# Patient Record
Sex: MALE | Race: WHITE | Employment: FULL TIME | URBAN - METROPOLITAN AREA
[De-identification: names, ages, dates, MRNs, and addresses within clinical notes are randomized per-mention and may not be internally consistent; named-entity substitution may affect disease eponyms.]

---

## 2022-12-09 ENCOUNTER — OFFICE VISIT (OUTPATIENT)
Dept: NEUROLOGY | Age: 32
End: 2022-12-09
Payer: OTHER GOVERNMENT

## 2022-12-09 VITALS
BODY MASS INDEX: 34.44 KG/M2 | WEIGHT: 246 LBS | SYSTOLIC BLOOD PRESSURE: 118 MMHG | DIASTOLIC BLOOD PRESSURE: 79 MMHG | HEIGHT: 71 IN | HEART RATE: 81 BPM

## 2022-12-09 DIAGNOSIS — G40.909 SEIZURE DISORDER (HCC): Primary | ICD-10-CM

## 2022-12-09 PROCEDURE — 99204 OFFICE O/P NEW MOD 45 MIN: CPT | Performed by: PSYCHIATRY & NEUROLOGY

## 2022-12-09 RX ORDER — SUMATRIPTAN 100 MG/1
100 TABLET, FILM COATED ORAL
COMMUNITY

## 2022-12-09 RX ORDER — TOPIRAMATE 50 MG/1
50 TABLET, FILM COATED ORAL 2 TIMES DAILY
Qty: 180 TABLET | Refills: 2 | Status: SHIPPED | OUTPATIENT
Start: 2022-12-09 | End: 2023-03-09

## 2022-12-09 RX ORDER — TOPIRAMATE 50 MG/1
50 TABLET, FILM COATED ORAL 2 TIMES DAILY
COMMUNITY
End: 2022-12-09 | Stop reason: SDUPTHER

## 2022-12-09 NOTE — PROGRESS NOTES
[facility]  [facility], [address], [address] [place] [address]  Ph: [phone] or [phone]  FAX: [phone]    Chief Complaint: Seizures     Dear No primary care provider on file. I had the pleasure of seeing your patient today in neurology consultation for his symptoms. As you would recall Rebecca Rouse is a 27 y.o. male. Patient presents to the office to establish neurological care. Patient is accompanied by his wife and son. Patient has a history seizures and migraine headaches. First reported seizure was in November 2021. Specifically, the patient had a grand mal seizure with foaming of the mouth. No tongue biting or bladder/bowel incontinence reported. Patient subsequently underwent workup and was put on Keppra for seizure prophylaxis. Patient reports that his MRI scan of the brain identified the patient as having hydrocephalus. EEG monitoring has been normal thus far. Patient had a second breakthrough event in July 2022 which was similar to the previous episode. Patient's wife reports that he experiences a secondary seizure after each of the two events. His Keppra dose was then increased from 1000 mg BID to 1500 mg BID. Patient has been seizure-free since the last episode in July. He admits that his headaches are under control with Topamax. Denies previous history of head trauma. No reported numbness, weakness, or tingling in his extremities. Patient's oldest son has a history of seizures. Denies driving at this time. Patient was previously in [employer] and lived in [place], but has since moved to [place].          Current prophylactic medication Dosage   Keppra 1500 mg BID   Topamax 50 mg BID           Previous prophylactic medications Reason for discontinuation                             Previous Abortive medications Reason for discontinuation   Tylenol Did not find relief    Ibuprofen    Naproxen    Excedrin Migraine    Fioricet              Past Medical History:   Diagnosis Date    Migraine     Seizures ([facility] [address]      No past surgical history on file. No Known Allergies  No family history on file.    Social History     Socioeconomic History    Marital status:      Spouse name: Not on file    Number of children: Not on file    Years of education: Not on file    Highest education level: Not on file   Occupational History    Not on file   Tobacco Use    Smoking status: Never    Smokeless tobacco: Never   Vaping Use    Vaping Use: Never used   Substance and Sexual Activity    Alcohol use: Yes     Comment: rare    Drug use: Never    Sexual activity: Not on file   Other Topics Concern    Not on file   Social History Narrative    Not on file     Social Determinants of Health     Financial Resource Strain: Not on file   Food Insecurity: Not on file   Transportation Needs: Not on file   Physical Activity: Not on file   Stress: Not on file   Social Connections: Not on file   Intimate Partner Violence: Not on file   Housing Stability: Not on file      /79 (Site: Left Upper Arm, Position: Sitting, Cuff Size: Medium Adult)   Pulse 81   Ht 5' 11\" (1.803 m)   Wt 246 lb (111.6 kg)   BMI 34.31 kg/m²       HEENT [] Hearing Loss  [] Visual Disturbance  [] Tinnitus  [] Eye pain   Respiratory [] Shortness of Breath  [] Cough  [] Snoring   Cardiovascular [] Chest Pain  [] Palpitations  [] Lightheaded   GI [] Constipation  [] Diarrhea  [] Swallowing change  [] Nausea/vomiting    [] Urinary Frequency  [] Urinary Urgency   Musculoskeletal [] Neck pain  [] Back pain  [] Muscle pain  [] Restless legs   Dermatologic [] Skin changes   Neurologic [] Memory loss/confusion  [x] Seizures  [] Trouble walking or imbalance  [] Dizziness  [] Sleep disturbance  [] Weakness  [] Numbness  [] Tremors  [] Speech Difficulty  [x] Headaches  [] Light Sensitivity  [] Sound Sensitivity   Endocrinology []Excessive thirst  []Excessive hunger   Psychiatric [] Anxiety/Depression  [] Hallucination Allergy/immunology []Hives/environmental allergies   Hematologic/lymph [] Abnormal bleeding  [] Abnormal bruising       General appearence: Normal. Well groomed. In no acute distress    Head: Normocephalic, atraumatic  Eyes: Extraocular movements intact, eye lids normal  Lungs: Respirations unlabored, chest wall no deformity  ENT: Normal external ear canals, no sinus tenderness  Heart: Regular rate rhythm  Abdomen: No masses, tenderness  Extremities: No cyanosis or edema, 2+ pulses  Musculoskeletal: Normal range of motion in all joints  Skin: Intact, normal skin color    Neurological examination:    Mental status   Alert and oriented; intact memory with no confusion, speech or language problems; no hallucinations or delusions     Cranial nerves   II - visual fields intact to confrontation                                                III, IV, VI - extra-ocular muscles full: no pupillary defect; no EPIFANIO, no nystagmus, no ptosis   V - normal facial sensation                                                               VII - normal facial symmetry                                                             VIII - intact hearing                                                                             IX, X - symmetrical palate                                                                  XI - symmetrical shoulder shrug                                                       XII - midline tongue without atrophy or fasciculation     Motor function  Normal muscle bulk and tone; normal power 5/5, including fine motor movements     Sensory function Intact to touch, pin, vibration, proprioception     Cerebellar Intact fine motor movement. No involuntary movements or tremors     Reflex function Intact 2+ DTR and symmetric.  Negative Babinski     Gait                  Normal station and gait       No results found for: LDLCALC, LDLCHOLESTEROL, LDLDIRECT  No components found for: CHLPL  No results found for: TRIG  No results found for: HDL  No results found for: LDLCALC  No results found for: LABVLDL  No results found for: LABA1C  No results found for: EAG  No results found for: [id number]   Neurological work up:  CT head  CTA head and neck  MRI brain   2 D echo     All of patient's labs were personally reviewed. All the imaging studies were personally reviewed and discussed with the patient. Assessment Recommendations:  Seizure Disorder  Chronic migraine headaches without aura or status migrainosus    The patient has a history of grand mal seizures likely secondary to hydrocephalus, which is well controlled with Keppra 1500 mg BID. I recommend he continue Keppra as directed for seizure prophylaxis. In addition to seizure medications, therapeutic lifestyle changes may also reduce the likelihood of seizures. These modifications include avoiding excessive alcohol use, obtaining adequate and appropriate sleep, and reducing stress. Dietary changes such as, a modified Atkins diet (high fat and protein/low carbohydrate), may reduce the risk of seizures as well. Seizure and epilepsy safety was also outlined. Specifically, we reviewed that he would not be able to operate a motor vehicle in the state of [place] until he has been seizure free for 6 months. Other restrictions were reviewed including operating heavy or electrical machinery, swimming alone, navigating heights, or engaging in any activity that may be injurious to him or others if there was an unexpected loss of consciousness or loss of function. It was explained to the patient that chronic use of some seizure medications may lead to osteoporosis or osteopenia. Daily supplementation of Vitamin D and calcium was encouraged along with a bone density study to determine overall bone health.  The potential risks from seizures including aspiration pneumonia, vertebral compression fracture, shoulder/mandible dislocation, head trauma, burns, drowning, and death (SUDEP) were briefly reviewed. The potential for neuronal injury from prolonged seizures or status epilepticus was discussed. In summary, the AAN quality measures for epilepsy (Neurology. 0125;28:1056-4715) were reviewed and outlined, as detailed above in the history of present illness, interval history, epilepsy history, impression, and discussion sections as well as in the patient's After Visit Summary. These include: (1a) Seizure frequency (at each encounter); (1b) Seizure intervention (at each encounter); (2) Etiology, seizure type, and epilepsy syndrome (at each encounter); (3) Querying and intervention for seizure medication side effects (at each encounter); (4) Personalized epilepsy safety issue and education (yearly); (5) Screening for psychiatric or behavioral health disorders (at each encounter); (6) Counseling for women with epilepsy of childbearing potential (yearly); and finally, (7) Referral of drug resistant epilepsy to a comprehensive epilepsy program/center (every 2 years). Patient reports that his headaches are currently under control, and I recommend he continue Topamax 50 mg BID for headache prophylaxis. If headaches become more frequent or severe, Topamax can be increased as the patient tolerates. Additionally, patient to continue Imitrex 100 mg as needed for abortive treatment. All medication side effects were discussed and questions answered. Patient to follow up in one year or sooner if symptoms worsen. No primary care provider on file. I would like to thank you for the consult. Please do not hesitate if you have any questions about the patient care. This note is created with the assistance of a speech-recognition program. While intending to generate a document that actually reflects the content of the visit, the document can still have some errors including those of syntax and sound a- like substitutions which may escape proofreading.   In such instances, actual meaning can be extrapolated by contextual derivation. Scribe Attestation:   By signing my name below, Javier Killian, attest that this documentation has been prepared under the direction and in the presence of Hazel Velasquez MD.    Electronically Signed: Jaime Lopez. 12/09/22.  2:35 PM

## 2022-12-13 ENCOUNTER — TELEPHONE (OUTPATIENT)
Dept: NEUROLOGY | Age: 32
End: 2022-12-13

## 2023-01-19 ENCOUNTER — TELEPHONE (OUTPATIENT)
Dept: NEUROLOGY | Age: 33
End: 2023-01-19

## 2023-01-19 NOTE — TELEPHONE ENCOUNTER
Landen called the office and stated that he has not had a seizure since 7/19/2022. He would like to know if Dr. Jeremiah Machado will clear him to be able to start driving. Please advise. Thank you.

## 2023-01-27 NOTE — TELEPHONE ENCOUNTER
Call placed back to Landen and this information was given. Patient asked if he needed a letter. I asked if someone was requiring this and he stated \"no\". I explained that he shouldn't need a letter. I asked that he call the office right away if he has a seizure. Patient verbally stated his understanding.

## 2023-05-22 DIAGNOSIS — G40.909 SEIZURE DISORDER (HCC): Primary | ICD-10-CM

## 2023-05-22 NOTE — TELEPHONE ENCOUNTER
Patient called in requesting a refill of Keppra 1500mg BID.       Medication active on med list yes      Date of last Rx: 12/9/2022 with unknown refills (popping up as historical provider)         verified by Veronica Jose LPN      Date of last appointment 12/9/2022    Next Visit Date:  Visit date not found (pt set to return around 12/9/2023)

## 2023-05-23 RX ORDER — LEVETIRACETAM 750 MG/1
1500 TABLET ORAL 2 TIMES DAILY
Qty: 120 TABLET | Refills: 6 | Status: SHIPPED | OUTPATIENT
Start: 2023-05-23 | End: 2023-12-19

## 2023-08-30 NOTE — TELEPHONE ENCOUNTER
Pharmacy requesting refill of Topmamx.       Medication active on med list yes      Date of last fill: 12/9/22  verified on 8/30/2023   verified by [facility] LPN      Date of last appointment 12/9/22    Next Visit Date:  12/4/2023

## 2023-08-31 RX ORDER — TOPIRAMATE 50 MG/1
TABLET, FILM COATED ORAL
Qty: 180 TABLET | Refills: 2 | Status: SHIPPED | OUTPATIENT
Start: 2023-08-31

## 2024-01-02 ENCOUNTER — TELEPHONE (OUTPATIENT)
Dept: NEUROLOGY | Age: 34
End: 2024-01-02

## 2024-01-02 ENCOUNTER — HOSPITAL ENCOUNTER (EMERGENCY)
Age: 34
Discharge: HOME | End: 2024-01-02
Attending: EMERGENCY MEDICINE
Payer: OTHER GOVERNMENT

## 2024-01-02 VITALS
DIASTOLIC BLOOD PRESSURE: 81 MMHG | HEIGHT: 72 IN | SYSTOLIC BLOOD PRESSURE: 120 MMHG | BODY MASS INDEX: 32.51 KG/M2 | WEIGHT: 240 LBS | RESPIRATION RATE: 24 BRPM | HEART RATE: 88 BPM | OXYGEN SATURATION: 98 % | TEMPERATURE: 98 F

## 2024-01-02 DIAGNOSIS — R40.4 TRANSIENT ALTERATION OF AWARENESS: Primary | ICD-10-CM

## 2024-01-02 LAB
ALBUMIN SERPL-MCNC: 4.6 G/DL (ref 3.5–5.2)
ALP SERPL-CCNC: 75 U/L (ref 40–129)
ALT SERPL-CCNC: 25 U/L (ref 5–41)
ANION GAP SERPL CALCULATED.3IONS-SCNC: 13 MMOL/L (ref 9–17)
AST SERPL-CCNC: 20 U/L
BASOPHILS # BLD: 0.1 K/UL (ref 0–0.2)
BASOPHILS NFR BLD: 1 % (ref 0–2)
BILIRUB SERPL-MCNC: 0.4 MG/DL (ref 0.3–1.2)
BILIRUB UR QL STRIP: NEGATIVE
BUN SERPL-MCNC: 8 MG/DL (ref 6–20)
CALCIUM SERPL-MCNC: 9.1 MG/DL (ref 8.6–10.4)
CHLORIDE SERPL-SCNC: 104 MMOL/L (ref 98–107)
CLARITY UR: CLEAR
CO2 SERPL-SCNC: 24 MMOL/L (ref 20–31)
COLOR UR: YELLOW
COMMENT: NORMAL
CREAT SERPL-MCNC: 1 MG/DL (ref 0.7–1.2)
EOSINOPHIL # BLD: 0.1 K/UL (ref 0–0.4)
EOSINOPHILS RELATIVE PERCENT: 1 % (ref 0–4)
ERYTHROCYTE [DISTWIDTH] IN BLOOD BY AUTOMATED COUNT: 13.3 % (ref 11.5–14.9)
FLUAV RNA RESP QL NAA+PROBE: NOT DETECTED
FLUBV RNA RESP QL NAA+PROBE: NOT DETECTED
GFR SERPL CREATININE-BSD FRML MDRD: >60 ML/MIN/1.73M2
GLUCOSE SERPL-MCNC: 93 MG/DL (ref 70–99)
GLUCOSE UR STRIP-MCNC: NEGATIVE MG/DL
HCT VFR BLD AUTO: 44.9 % (ref 41–53)
HGB BLD-MCNC: 15.3 G/DL (ref 13.5–17.5)
HGB UR QL STRIP.AUTO: NEGATIVE
KETONES UR STRIP-MCNC: NEGATIVE MG/DL
LEUKOCYTE ESTERASE UR QL STRIP: NEGATIVE
LYMPHOCYTES NFR BLD: 2.8 K/UL (ref 1–4.8)
LYMPHOCYTES RELATIVE PERCENT: 33 % (ref 24–44)
MAGNESIUM SERPL-MCNC: 2.3 MG/DL (ref 1.6–2.6)
MCH RBC QN AUTO: 29.6 PG (ref 26–34)
MCHC RBC AUTO-ENTMCNC: 34.1 G/DL (ref 31–37)
MCV RBC AUTO: 87 FL (ref 80–100)
MONOCYTES NFR BLD: 0.5 K/UL (ref 0.1–1.3)
MONOCYTES NFR BLD: 6 % (ref 1–7)
NEUTROPHILS NFR BLD: 59 % (ref 36–66)
NEUTS SEG NFR BLD: 5 K/UL (ref 1.3–9.1)
NITRITE UR QL STRIP: NEGATIVE
PH UR STRIP: 6.5 [PH] (ref 5–8)
PLATELET # BLD AUTO: 266 K/UL (ref 150–450)
PMV BLD AUTO: 8.2 FL (ref 6–12)
POTASSIUM SERPL-SCNC: 3.8 MMOL/L (ref 3.7–5.3)
PROT SERPL-MCNC: 7.3 G/DL (ref 6.4–8.3)
PROT UR STRIP-MCNC: NEGATIVE MG/DL
RBC # BLD AUTO: 5.16 M/UL (ref 4.5–5.9)
SARS-COV-2 RNA RESP QL NAA+PROBE: NOT DETECTED
SODIUM SERPL-SCNC: 141 MMOL/L (ref 135–144)
SOURCE: NORMAL
SP GR UR STRIP: 1.01 (ref 1–1.03)
SPECIMEN DESCRIPTION: NORMAL
UROBILINOGEN UR STRIP-ACNC: NORMAL EU/DL (ref 0–1)
WBC OTHER # BLD: 8.5 K/UL (ref 3.5–11)

## 2024-01-02 PROCEDURE — 99284 EMERGENCY DEPT VISIT MOD MDM: CPT

## 2024-01-02 PROCEDURE — 93005 ELECTROCARDIOGRAM TRACING: CPT | Performed by: EMERGENCY MEDICINE

## 2024-01-02 PROCEDURE — 87636 SARSCOV2 & INF A&B AMP PRB: CPT

## 2024-01-02 PROCEDURE — 85025 COMPLETE CBC W/AUTO DIFF WBC: CPT

## 2024-01-02 PROCEDURE — 81003 URINALYSIS AUTO W/O SCOPE: CPT

## 2024-01-02 PROCEDURE — 83735 ASSAY OF MAGNESIUM: CPT

## 2024-01-02 PROCEDURE — 36415 COLL VENOUS BLD VENIPUNCTURE: CPT

## 2024-01-02 PROCEDURE — 80053 COMPREHEN METABOLIC PANEL: CPT

## 2024-01-02 ASSESSMENT — ENCOUNTER SYMPTOMS
VOMITING: 0
TROUBLE SWALLOWING: 0
BACK PAIN: 0
COLOR CHANGE: 0
DIARRHEA: 0
RHINORRHEA: 0
SHORTNESS OF BREATH: 0
BLOOD IN STOOL: 0
SORE THROAT: 0
EYE PAIN: 0
SINUS PRESSURE: 0
NAUSEA: 0
WHEEZING: 0
CONSTIPATION: 0
EYE DISCHARGE: 0
CHEST TIGHTNESS: 0
FACIAL SWELLING: 0
EYE REDNESS: 0
COUGH: 0
ABDOMINAL PAIN: 0

## 2024-01-02 ASSESSMENT — LIFESTYLE VARIABLES
HOW MANY STANDARD DRINKS CONTAINING ALCOHOL DO YOU HAVE ON A TYPICAL DAY: PATIENT DOES NOT DRINK
HOW OFTEN DO YOU HAVE A DRINK CONTAINING ALCOHOL: NEVER

## 2024-01-02 ASSESSMENT — PAIN SCALES - GENERAL: PAINLEVEL_OUTOF10: 3

## 2024-01-02 ASSESSMENT — PAIN - FUNCTIONAL ASSESSMENT: PAIN_FUNCTIONAL_ASSESSMENT: 0-10

## 2024-01-02 NOTE — ED PROVIDER NOTES
[facility]  eMERGENCY dEPARTMENT eNCOUnter      Pt Name: Landen Gamez  MRN: [id number]  Birthdate 1990  Date of evaluation: 1/2/24      CHIEF COMPLAINT       Chief Complaint   Patient presents with    Altered Mental Status     Slow to respond to questions    Headache     Since 1030 while at work, had a sharp pain to back of head  \"Not sure if I was dreaming or real\" but denies hallucinations  Hx of seizure, compliant with meds  Denies head trauma    Chest Pain     Tightness that began this am while at work         HISTORY OF PRESENT ILLNESS    Landen Gamez is a 33 y.o. male who presents complaining of confusion.  Patient states that he was at work today around 10 AM when he discussed suddenly started feeling off.  Patient states that he felt confused and when he went to go talk to his boss he started having a sharp pain in the back of his head and then developed a headache in the front of his head.  Patient states he just does not feel right.  Patient states he has not been ill recently.  Patient does have a history of seizures and is taking all of his medications like he supposed to.  Patient has no numbness tingling or weakness.  Patient does state that he feels better than he did initially but is still not back to normal.  Wife states that this is similar but not as severe as what he usually gets after a full-blown seizure.  Patient states he does not think he had a seizure and no one told him he did at work.      REVIEW OF SYSTEMS       Review of Systems   Constitutional:  Negative for activity change, appetite change, chills, diaphoresis and fever.   HENT:  Negative for congestion, ear pain, facial swelling, nosebleeds, rhinorrhea, sinus pressure, sore throat and trouble swallowing.    Eyes:  Negative for pain, discharge and redness.   Respiratory:  Negative for cough, chest tightness, shortness of breath and wheezing.    Cardiovascular:  Negative for chest pain, palpitations and leg swelling.

## 2024-01-02 NOTE — DISCHARGE INSTRUCTIONS
Thank you for visiting [facility] ED.  You need to call in morning to make appointment as directed with appropriate doctor.  Should you have any questions regarding your care or further treatment, please call [facility] Emergency Department at [phone].  Take any medications as prescribed, if given any.  Return to ED if symptoms worsen, do not improve, and unable to follow up with your physician, or other concerns arise.

## 2024-01-02 NOTE — TELEPHONE ENCOUNTER
Patient called in stating that he woke up this morning feeling \"spaced out\". He said that he is just feeling out of it. He is wondering if this could be a side effect of taking Briviact. He started taking it on 12/23/23. This is his first episode of feeling like this. This is Dr. Patrick's patient. Please advise.

## 2024-01-03 LAB
EKG ATRIAL RATE: 80 BPM
EKG P AXIS: 27 DEGREES
EKG P-R INTERVAL: 154 MS
EKG Q-T INTERVAL: 376 MS
EKG QRS DURATION: 86 MS
EKG QTC CALCULATION (BAZETT): 433 MS
EKG R AXIS: 92 DEGREES
EKG T AXIS: 12 DEGREES
EKG VENTRICULAR RATE: 80 BPM
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
## 2024-01-04 ENCOUNTER — OFFICE VISIT (OUTPATIENT)
Dept: PRIMARY CARE CLINIC | Age: 34
End: 2024-01-04
Payer: OTHER GOVERNMENT

## 2024-01-04 VITALS
SYSTOLIC BLOOD PRESSURE: 118 MMHG | RESPIRATION RATE: 16 BRPM | DIASTOLIC BLOOD PRESSURE: 80 MMHG | HEIGHT: 72 IN | HEART RATE: 87 BPM | OXYGEN SATURATION: 99 % | BODY MASS INDEX: 32.45 KG/M2 | WEIGHT: 239.6 LBS

## 2024-01-04 DIAGNOSIS — G40.909 SEIZURE DISORDER (HCC): ICD-10-CM

## 2024-01-04 DIAGNOSIS — Z13.6 ENCOUNTER FOR LIPID SCREENING FOR CARDIOVASCULAR DISEASE: ICD-10-CM

## 2024-01-04 DIAGNOSIS — Z13.220 ENCOUNTER FOR LIPID SCREENING FOR CARDIOVASCULAR DISEASE: ICD-10-CM

## 2024-01-04 DIAGNOSIS — Z00.00 ANNUAL PHYSICAL EXAM: Primary | ICD-10-CM

## 2024-01-04 DIAGNOSIS — G43.709 CHRONIC MIGRAINE WITHOUT AURA WITHOUT STATUS MIGRAINOSUS, NOT INTRACTABLE: ICD-10-CM

## 2024-01-04 PROBLEM — M25.519 ARTHRALGIA OF SHOULDER: Status: ACTIVE | Noted: 2024-01-04

## 2024-01-04 PROBLEM — R45.81 CHRONIC LOW SELF ESTEEM: Status: ACTIVE | Noted: 2024-01-04

## 2024-01-04 PROBLEM — F51.04 CHRONIC INSOMNIA: Status: ACTIVE | Noted: 2024-01-04

## 2024-01-04 PROBLEM — S93.402A SPRAIN OF LEFT ANKLE: Status: ACTIVE | Noted: 2020-03-11

## 2024-01-04 PROBLEM — F60.6 AVOIDANT PERSONALITY DISORDER (HCC): Status: RESOLVED | Noted: 2024-01-04 | Resolved: 2024-01-04

## 2024-01-04 PROBLEM — J18.9 PNEUMONIA: Status: ACTIVE | Noted: 2024-01-04

## 2024-01-04 PROBLEM — M75.80 ROTATOR CUFF TENDINITIS: Status: ACTIVE | Noted: 2024-01-04

## 2024-01-04 PROBLEM — E66.01 MORBID OBESITY (HCC): Status: ACTIVE | Noted: 2024-01-04

## 2024-01-04 PROBLEM — S70.01XA CONTUSION OF RIGHT HIP REGION: Status: ACTIVE | Noted: 2024-01-04

## 2024-01-04 PROBLEM — F60.6 AVOIDANT PERSONALITY DISORDER (HCC): Status: ACTIVE | Noted: 2024-01-04

## 2024-01-04 PROBLEM — F34.1 DYSTHYMIA: Status: ACTIVE | Noted: 2024-01-04

## 2024-01-04 PROBLEM — M25.579 ANKLE PAIN: Status: ACTIVE | Noted: 2024-01-04

## 2024-01-04 PROBLEM — H52.209 ASTIGMATISM: Status: ACTIVE | Noted: 2024-01-04

## 2024-01-04 PROBLEM — H52.10 MYOPIA: Status: ACTIVE | Noted: 2024-01-04

## 2024-01-04 PROBLEM — M19.90 OSTEOARTHRITIS: Status: ACTIVE | Noted: 2024-01-04

## 2024-01-04 PROBLEM — L60.0 INGROWING NAIL: Status: ACTIVE | Noted: 2024-01-04

## 2024-01-04 PROBLEM — E66.01 MORBID OBESITY (HCC): Status: RESOLVED | Noted: 2024-01-04 | Resolved: 2024-01-04

## 2024-01-04 PROCEDURE — 99385 PREV VISIT NEW AGE 18-39: CPT | Performed by: PHYSICIAN ASSISTANT

## 2024-01-04 SDOH — ECONOMIC STABILITY: INCOME INSECURITY: HOW HARD IS IT FOR YOU TO PAY FOR THE VERY BASICS LIKE FOOD, HOUSING, MEDICAL CARE, AND HEATING?: NOT HARD AT ALL

## 2024-01-04 SDOH — ECONOMIC STABILITY: FOOD INSECURITY: WITHIN THE PAST 12 MONTHS, YOU WORRIED THAT YOUR FOOD WOULD RUN OUT BEFORE YOU GOT MONEY TO BUY MORE.: NEVER TRUE

## 2024-01-04 SDOH — ECONOMIC STABILITY: FOOD INSECURITY: WITHIN THE PAST 12 MONTHS, THE FOOD YOU BOUGHT JUST DIDN'T LAST AND YOU DIDN'T HAVE MONEY TO GET MORE.: NEVER TRUE

## 2024-01-04 SDOH — ECONOMIC STABILITY: HOUSING INSECURITY
IN THE LAST 12 MONTHS, WAS THERE A TIME WHEN YOU DID NOT HAVE A STEADY PLACE TO SLEEP OR SLEPT IN A SHELTER (INCLUDING NOW)?: NO

## 2024-01-04 ASSESSMENT — ENCOUNTER SYMPTOMS
RHINORRHEA: 0
EYE PAIN: 0
VOMITING: 0
COUGH: 0
SHORTNESS OF BREATH: 0
NAUSEA: 0
SINUS PAIN: 0
CONSTIPATION: 0
DIARRHEA: 0
ABDOMINAL PAIN: 0
BACK PAIN: 0

## 2024-01-04 ASSESSMENT — PATIENT HEALTH QUESTIONNAIRE - PHQ9
SUM OF ALL RESPONSES TO PHQ QUESTIONS 1-9: 0
SUM OF ALL RESPONSES TO PHQ9 QUESTIONS 1 & 2: 0
SUM OF ALL RESPONSES TO PHQ QUESTIONS 1-9: 0
1. LITTLE INTEREST OR PLEASURE IN DOING THINGS: 0
2. FEELING DOWN, DEPRESSED OR HOPELESS: 0

## 2024-01-04 NOTE — PROGRESS NOTES
[facility]  [facility]  [address] [place] [address]  [address]  [facility] [address]  Dept: [phone]  Dept Fax: [phone]    Landen Gamez is a 33 y.o. male who presents today for his medical conditions/complaints as noted below.    Chief Complaint   Patient presents with    Establish Care       HPI:     Patient presents to the office to establish care.  No recent PCP.  He has past medical history including seizure disorder and migraine disorder.  Patient is currently following with [facility] neurology.  They recently changed Keppra to Briviact for seizure management due to keppra side effects.  During this transition he was recently seen in the ED for transient alteration of awareness/mild cognitive change.  He was advised to follow with neurology.  Blood work and EKG were negative.  He did not have any recent head injury or trauma.  Additionally, patient is taking Topamax for management of migraines.  He continues to have migraines a couple times per week.  He is using sumatriptan which sometimes works, but also sometimes causes nausea.    Otherwise, no concerns or complaints.  Denies any lightheadedness or dizziness.  No chest pain or shortness of breath.  No current headache.  No concern for seizure today.  Blood pressure well-controlled.  Weight stable.  We reviewed health maintenance and history.  Defers flu vaccine.    Patient was previously in [employer] and is now retired and working as a .  He has a fairly strenuous job.        No results found for: \"LABA1C\"          ( goal A1C is < 7)   No components found for: \"LABMICR\"  No results found for: \"LDLCHOLESTEROL\", \"LDLCALC\"    (goal LDL is <100)   AST (U/L)   Date Value   01/02/2024 20     ALT (U/L)   Date Value   01/02/2024 25     BUN (mg/dL)   Date Value   01/02/2024 8     BP Readings from Last 3 Encounters:   01/04/24 118/80   01/02/24 120/81   12/22/23 133/86          (goal 120/80)    Past Medical History:   Diagnosis Date

## 2024-01-05 NOTE — TELEPHONE ENCOUNTER
Writer spoke with patient's wife. She stated that during this episode he was experiencing lightheadedness, dizziness, he could not tell if he was awake or dreaming, he had a sharp pain up the back of head, and he was off balance. She said that since this incident he has returned to work and has not had any more seizure activity.

## 2024-02-14 ENCOUNTER — OFFICE VISIT (OUTPATIENT)
Dept: PRIMARY CARE CLINIC | Age: 34
End: 2024-02-14
Payer: OTHER GOVERNMENT

## 2024-02-14 VITALS
BODY MASS INDEX: 33.56 KG/M2 | DIASTOLIC BLOOD PRESSURE: 82 MMHG | RESPIRATION RATE: 16 BRPM | HEIGHT: 72 IN | OXYGEN SATURATION: 99 % | WEIGHT: 247.8 LBS | SYSTOLIC BLOOD PRESSURE: 124 MMHG | HEART RATE: 80 BPM
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
## 2024-02-14 DIAGNOSIS — S16.1XXA STRAIN OF NECK MUSCLE, INITIAL ENCOUNTER: Primary | ICD-10-CM

## 2024-02-14 PROCEDURE — 99213 OFFICE O/P EST LOW 20 MIN: CPT | Performed by: PHYSICIAN ASSISTANT

## 2024-02-14 RX ORDER — CYCLOBENZAPRINE HCL 10 MG
10 TABLET ORAL 2 TIMES DAILY PRN
Qty: 20 TABLET | Refills: 0 | Status: SHIPPED | OUTPATIENT
Start: 2024-02-14 | End: 2024-02-24

## 2024-02-14 RX ORDER — NAPROXEN 500 MG/1
500 TABLET ORAL 2 TIMES DAILY WITH MEALS
Qty: 60 TABLET | Refills: 0 | Status: SHIPPED | OUTPATIENT
Start: 2024-02-14
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
## 2024-02-27 DIAGNOSIS — G43.709 CHRONIC MIGRAINE W/O AURA, NOT INTRACTABLE, W/O STAT MIGR: Primary | ICD-10-CM

## 2024-02-27 DIAGNOSIS — G40.909 SEIZURE DISORDER (HCC): ICD-10-CM

## 2024-02-27 NOTE — TELEPHONE ENCOUNTER
Received a faxed request from [employer] for his Briviact and Topamax. This was clarified with his wife. She stated that it does need to go there because they are having so many issues with [facility].    Scripts sent to Dr Patrick for approval.

## 2024-02-28 RX ORDER — TOPIRAMATE 50 MG/1
50 TABLET, FILM COATED ORAL 2 TIMES DAILY
Qty: 180 TABLET | Refills: 1 | Status: SHIPPED | OUTPATIENT
Start: 2024-02-28 | End: 2024-08-26

## 2024-08-09 ENCOUNTER — TELEPHONE (OUTPATIENT)
Dept: NEUROLOGY | Age: 34
End: 2024-08-09

## 2024-08-09 DIAGNOSIS — M54.2 NECK PAIN WITHOUT INJURY: ICD-10-CM

## 2024-08-09 DIAGNOSIS — G43.709 CHRONIC MIGRAINE W/O AURA, NOT INTRACTABLE, W/O STAT MIGR: Primary | ICD-10-CM

## 2024-08-09 DIAGNOSIS — R20.0 NUMBNESS AND TINGLING OF RIGHT FACE: ICD-10-CM

## 2024-08-09 DIAGNOSIS — G40.909 SEIZURE DISORDER (HCC): ICD-10-CM

## 2024-08-09 DIAGNOSIS — R20.2 NUMBNESS AND TINGLING OF RIGHT FACE: ICD-10-CM

## 2024-08-09 DIAGNOSIS — R40.4 TRANSIENT ALTERATION OF AWARENESS: ICD-10-CM

## 2024-08-09 DIAGNOSIS — R20.0 NUMBNESS AND TINGLING OF RIGHT ARM: ICD-10-CM

## 2024-08-09 DIAGNOSIS — R20.2 NUMBNESS AND TINGLING OF RIGHT ARM: ICD-10-CM

## 2024-08-09 NOTE — TELEPHONE ENCOUNTER
Patient called into office about some concerns that he has going on with him. Patient states he is experiencing some tingling and numbness on his right side of body including his head and arm. He mentioned that an incident occurred yesterday while he was writing a receipt where he felt confused and hands started shaking, then he felt like he fell asleep for a min and woke right back up. He is also experiencing some sharp icy like pains coming down his neck shoulder area as well. He denies any changes and or injuries. I transferred his call to scheduling to get a sooner appointment then his December appointment. He made an appointment for the 21 st of August for his new symptoms.

## 2024-08-12 NOTE — TELEPHONE ENCOUNTER
Landen called the office and this information was given.  Patient was agreeable to have MRI through the [employer] system.  Writer advised that the order would be placed and he could give the scheduling department a call (number provided).  Writer asked that he call if there were any problems.  Patient verbally stated his understanding.

## 2024-08-17 ENCOUNTER — HOSPITAL ENCOUNTER (OUTPATIENT)
Dept: MRI IMAGING | Age: 34
End: 2024-08-17
Attending: PSYCHIATRY & NEUROLOGY
Payer: OTHER GOVERNMENT

## 2024-08-17 DIAGNOSIS — R20.2 NUMBNESS AND TINGLING OF RIGHT ARM: ICD-10-CM

## 2024-08-17 DIAGNOSIS — G43.709 CHRONIC MIGRAINE W/O AURA, NOT INTRACTABLE, W/O STAT MIGR: ICD-10-CM

## 2024-08-17 DIAGNOSIS — R20.2 NUMBNESS AND TINGLING OF RIGHT FACE: ICD-10-CM

## 2024-08-17 DIAGNOSIS — R20.0 NUMBNESS AND TINGLING OF RIGHT FACE: ICD-10-CM

## 2024-08-17 DIAGNOSIS — R40.4 TRANSIENT ALTERATION OF AWARENESS: ICD-10-CM

## 2024-08-17 DIAGNOSIS — R20.0 NUMBNESS AND TINGLING OF RIGHT ARM: ICD-10-CM

## 2024-08-17 DIAGNOSIS — G40.909 SEIZURE DISORDER (HCC): ICD-10-CM

## 2024-08-17 DIAGNOSIS — M54.2 NECK PAIN WITHOUT INJURY: ICD-10-CM

## 2024-08-17 PROCEDURE — A9579 GAD-BASE MR CONTRAST NOS,1ML: HCPCS | Performed by: PSYCHIATRY & NEUROLOGY

## 2024-08-17 PROCEDURE — 70553 MRI BRAIN STEM W/O & W/DYE: CPT

## 2024-08-17 PROCEDURE — 6360000004 HC RX CONTRAST MEDICATION: Performed by: PSYCHIATRY & NEUROLOGY

## 2024-08-17 RX ADMIN — GADOTERIDOL 20 ML: 279.3 INJECTION, SOLUTION INTRAVENOUS at 09:11

## 2024-08-21 ENCOUNTER — OFFICE VISIT (OUTPATIENT)
Dept: NEUROLOGY | Age: 34
End: 2024-08-21
Payer: OTHER GOVERNMENT

## 2024-08-21 VITALS
WEIGHT: 247 LBS | DIASTOLIC BLOOD PRESSURE: 87 MMHG | BODY MASS INDEX: 34.58 KG/M2 | HEART RATE: 74 BPM | HEIGHT: 71 IN | SYSTOLIC BLOOD PRESSURE: 131 MMHG

## 2024-08-21 DIAGNOSIS — G43.711 CHRONIC MIGRAINE WITHOUT AURA, WITH INTRACTABLE MIGRAINE, SO STATED, WITH STATUS MIGRAINOSUS: Primary | ICD-10-CM

## 2024-08-21 PROCEDURE — 99214 OFFICE O/P EST MOD 30 MIN: CPT | Performed by: PSYCHIATRY & NEUROLOGY

## 2024-08-21 RX ORDER — RIMEGEPANT SULFATE 75 MG/75MG
75 TABLET, ORALLY DISINTEGRATING ORAL PRN
Qty: 30 TABLET | Refills: 1 | Status: SHIPPED | OUTPATIENT
Start: 2024-08-21 | End: 2024-09-20

## 2024-08-21 NOTE — PROGRESS NOTES
imbalance  [] Dizziness  [] Sleep disturbance  [] Weakness  [] Numbness  [] Tremors  [] Speech Difficulty  [x] Headaches  [] Light Sensitivity  [] Sound Sensitivity   Endocrinology []Excessive thirst  []Excessive hunger   Psychiatric [] Anxiety/Depression  [] Hallucination   Allergy/immunology []Hives/environmental allergies   Hematologic/lymph [] Abnormal bleeding  [] Abnormal bruising       General appearence: Normal. Well groomed. In no acute distress    Head: Normocephalic, atraumatic  Eyes: Extraocular movements intact, eye lids normal  Lungs: Respirations unlabored, chest wall no deformity  ENT: Normal external ear canals, no sinus tenderness  Heart: Regular rate rhythm  Abdomen: No masses, tenderness  Extremities: No cyanosis or edema, 2+ pulses  Musculoskeletal: Normal range of motion in all joints  Skin: Intact, normal skin color    Neurological examination:    Mental status   Alert and oriented; intact memory with no confusion, speech or language problems; no hallucinations or delusions     Cranial nerves   II - visual fields intact to confrontation                                                III, IV, VI - extra-ocular muscles full: no pupillary defect; no EPIFANIO, no nystagmus, no ptosis   V - normal facial sensation                                                               VII - normal facial symmetry                                                             VIII - intact hearing                                                                             IX, X - symmetrical palate                                                                  XI - symmetrical shoulder shrug                                                       XII - midline tongue without atrophy or fasciculation     Motor function  Normal muscle bulk and tone; normal power 5/5, including fine motor movements     Sensory function Intact to touch, pin, vibration, proprioception     Cerebellar Intact fine motor movement. No

## 2024-08-31 DIAGNOSIS — G40.909 SEIZURE DISORDER (HCC): ICD-10-CM

## 2024-09-03 NOTE — TELEPHONE ENCOUNTER
Pharmacy requesting refill of Briviact.      Medication active on med list yes      Date of last fill: 2/28/24  verified on 9/3/2024   verified by [facility] LPN      Date of last appointment 8/21/24    Next Visit Date:  Visit date not found

## 2024-09-23 DIAGNOSIS — G40.909 SEIZURE DISORDER (HCC): ICD-10-CM

## 2024-09-23 DIAGNOSIS — G43.709 CHRONIC MIGRAINE W/O AURA, NOT INTRACTABLE, W/O STAT MIGR: ICD-10-CM

## 2024-09-23 RX ORDER — TOPIRAMATE 50 MG/1
50 TABLET, FILM COATED ORAL 2 TIMES DAILY
Qty: 180 TABLET | Refills: 3 | Status: SHIPPED | OUTPATIENT
Start: 2024-09-23 | End: 2025-09-23

## 2025-03-07 DIAGNOSIS — G40.909 SEIZURE DISORDER (HCC): ICD-10-CM

## 2025-03-07 NOTE — TELEPHONE ENCOUNTER
Pharmacy requesting refill of Briviact 100mg.  OARRS report ran and reviewed by writer. No signs of misuse or abuse.    Medication active on med list yes    Date of last fill: 9/3/2024  with 1 refills verified on 3/7/2025  verified by YORDY BURNS    Date of last appointment 8/21/2024    Next Visit Date:  Visit date not found

## 2025-03-20 ENCOUNTER — OFFICE VISIT (OUTPATIENT)
Dept: PRIMARY CARE CLINIC | Age: 35
End: 2025-03-20
Payer: OTHER GOVERNMENT

## 2025-03-20 VITALS
BODY MASS INDEX: 35.14 KG/M2 | RESPIRATION RATE: 16 BRPM | WEIGHT: 251 LBS | DIASTOLIC BLOOD PRESSURE: 84 MMHG | HEART RATE: 76 BPM | SYSTOLIC BLOOD PRESSURE: 120 MMHG | OXYGEN SATURATION: 98 % | HEIGHT: 71 IN

## 2025-03-20 DIAGNOSIS — G43.709 CHRONIC MIGRAINE WITHOUT AURA WITHOUT STATUS MIGRAINOSUS, NOT INTRACTABLE: ICD-10-CM

## 2025-03-20 DIAGNOSIS — G40.909 SEIZURE DISORDER (HCC): ICD-10-CM

## 2025-03-20 DIAGNOSIS — Z00.00 ANNUAL PHYSICAL EXAM: Primary | ICD-10-CM

## 2025-03-20 DIAGNOSIS — Z13.220 ENCOUNTER FOR LIPID SCREENING FOR CARDIOVASCULAR DISEASE: ICD-10-CM

## 2025-03-20 DIAGNOSIS — Z13.6 ENCOUNTER FOR LIPID SCREENING FOR CARDIOVASCULAR DISEASE: ICD-10-CM

## 2025-03-20 PROCEDURE — 99395 PREV VISIT EST AGE 18-39: CPT | Performed by: PHYSICIAN ASSISTANT

## 2025-03-20 RX ORDER — RIZATRIPTAN BENZOATE 10 MG/1
10 TABLET ORAL
Qty: 9 TABLET | Refills: 0 | Status: SHIPPED | OUTPATIENT
Start: 2025-03-20

## 2025-03-20 SDOH — ECONOMIC STABILITY: FOOD INSECURITY: WITHIN THE PAST 12 MONTHS, YOU WORRIED THAT YOUR FOOD WOULD RUN OUT BEFORE YOU GOT MONEY TO BUY MORE.: NEVER TRUE

## 2025-03-20 SDOH — ECONOMIC STABILITY: FOOD INSECURITY: WITHIN THE PAST 12 MONTHS, THE FOOD YOU BOUGHT JUST DIDN'T LAST AND YOU DIDN'T HAVE MONEY TO GET MORE.: NEVER TRUE

## 2025-03-20 ASSESSMENT — ENCOUNTER SYMPTOMS
ABDOMINAL PAIN: 0
EYE PAIN: 0
CONSTIPATION: 0
RHINORRHEA: 0
COUGH: 0
VOMITING: 0
STRIDOR: 0
DIARRHEA: 0
NAUSEA: 0
BACK PAIN: 0
SHORTNESS OF BREATH: 0
SINUS PAIN: 0

## 2025-03-20 ASSESSMENT — PATIENT HEALTH QUESTIONNAIRE - PHQ9
3. TROUBLE FALLING OR STAYING ASLEEP: NOT AT ALL
10. IF YOU CHECKED OFF ANY PROBLEMS, HOW DIFFICULT HAVE THESE PROBLEMS MADE IT FOR YOU TO DO YOUR WORK, TAKE CARE OF THINGS AT HOME, OR GET ALONG WITH OTHER PEOPLE: NOT DIFFICULT AT ALL
SUM OF ALL RESPONSES TO PHQ QUESTIONS 1-9: 0
SUM OF ALL RESPONSES TO PHQ QUESTIONS 1-9: 0
7. TROUBLE CONCENTRATING ON THINGS, SUCH AS READING THE NEWSPAPER OR WATCHING TELEVISION: NOT AT ALL
SUM OF ALL RESPONSES TO PHQ QUESTIONS 1-9: 0
1. LITTLE INTEREST OR PLEASURE IN DOING THINGS: NOT AT ALL
2. FEELING DOWN, DEPRESSED OR HOPELESS: NOT AT ALL
4. FEELING TIRED OR HAVING LITTLE ENERGY: NOT AT ALL
6. FEELING BAD ABOUT YOURSELF - OR THAT YOU ARE A FAILURE OR HAVE LET YOURSELF OR YOUR FAMILY DOWN: NOT AT ALL
8. MOVING OR SPEAKING SO SLOWLY THAT OTHER PEOPLE COULD HAVE NOTICED. OR THE OPPOSITE, BEING SO FIGETY OR RESTLESS THAT YOU HAVE BEEN MOVING AROUND A LOT MORE THAN USUAL: NOT AT ALL
9. THOUGHTS THAT YOU WOULD BE BETTER OFF DEAD, OR OF HURTING YOURSELF: NOT AT ALL
SUM OF ALL RESPONSES TO PHQ QUESTIONS 1-9: 0
5. POOR APPETITE OR OVEREATING: NOT AT ALL

## 2025-03-20 NOTE — PROGRESS NOTES
[facility]  [facility]  [address] [place] [address] [address]  [address]  [facility] [address]  Dept: [phone]  Dept Fax: [phone]    Landen Gamez is a 34 y.o. male who presents today for his medical conditions/complaints as noted below.    Chief Complaint   Patient presents with    Annual Exam       HPI:     Patient presents to the office for annual physical exam.  He has past medical history including chronic migraine, seizure disorder, dysthymia.    Overall, patient reports doing fairly well.  No significant new or acute changes.  Follows with neurology for management of chronic migraine and seizure disorder.  No new features.  No new seizures.  Unfortunately insurance denied Nurtec prescription from neurologist as he needs to fail to triptans.  He has tried sumatriptan in the past.  This was not effective.  He would like to see if there is an alternative.    No other concerns.  We reviewed health maintenance and screenings.  He admits to lack of structured activity.  He also admits to eating out on a regular basis.  BP stable.  Weight slightly increased.        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 No pertinent surgical history.    Family History   Problem Relation Age of Onset    Diabetes Maternal Grandfather     Prostate Cancer Maternal Grandfather        Social History     Tobacco Use    Smoking